# Patient Record
Sex: FEMALE | URBAN - METROPOLITAN AREA
[De-identification: names, ages, dates, MRNs, and addresses within clinical notes are randomized per-mention and may not be internally consistent; named-entity substitution may affect disease eponyms.]

---

## 2024-08-22 ENCOUNTER — OFFICE VISIT (OUTPATIENT)
Dept: URBAN - METROPOLITAN AREA CLINIC 98 | Facility: CLINIC | Age: 49
End: 2024-08-22

## 2024-08-22 ENCOUNTER — DASHBOARD ENCOUNTERS (OUTPATIENT)
Age: 49
End: 2024-08-22

## 2024-08-22 VITALS
HEIGHT: 60 IN | BODY MASS INDEX: 28.27 KG/M2 | HEART RATE: 68 BPM | WEIGHT: 144 LBS | SYSTOLIC BLOOD PRESSURE: 136 MMHG | DIASTOLIC BLOOD PRESSURE: 76 MMHG | TEMPERATURE: 98.1 F

## 2024-08-22 NOTE — HPI-TODAY'S VISIT:
8/22/2024- Mary Harmon NP - 50 yo female patient here with complaints of persistent h.pylori - Referred by Lizzy Michael NP -  Laura # [id number]- Italian - H.pylori-test result not present - 1st treatment 4/2024- clarithromycin, amoxicillin, omeprazole, 14 days - Test for cure-POSITIVE- 6/2024 - 2nd treatment- 6/2024- omeprazole, tetracycline and metronidazole- only completed 3 days of 2nd treatment - Discontinue 2nd treatment because of nausea, vomiting, HA and dizziness - PCP retested for h.pylori this week-Results will be available next Monday - Still having nausea, dizziness, abdominal discomfort